# Patient Record
Sex: MALE | Race: BLACK OR AFRICAN AMERICAN | NOT HISPANIC OR LATINO | Employment: FULL TIME | URBAN - METROPOLITAN AREA
[De-identification: names, ages, dates, MRNs, and addresses within clinical notes are randomized per-mention and may not be internally consistent; named-entity substitution may affect disease eponyms.]

---

## 2018-03-02 ENCOUNTER — HOSPITAL ENCOUNTER (EMERGENCY)
Facility: HOSPITAL | Age: 28
Discharge: HOME | End: 2018-03-03
Attending: FAMILY MEDICINE

## 2018-03-02 DIAGNOSIS — F10.929 ACUTE ALCOHOLIC INTOXICATION WITH COMPLICATION: Primary | ICD-10-CM

## 2018-03-02 DIAGNOSIS — R41.82 ALTERED MENTAL STATUS: ICD-10-CM

## 2018-03-02 DIAGNOSIS — R46.89 COMBATIVE BEHAVIOR: ICD-10-CM

## 2018-03-02 DIAGNOSIS — S61.210A LACERATION OF RIGHT INDEX FINGER WITHOUT FOREIGN BODY WITHOUT DAMAGE TO NAIL, INITIAL ENCOUNTER: ICD-10-CM

## 2018-03-02 DIAGNOSIS — S61.212A LACERATION OF RIGHT MIDDLE FINGER WITHOUT FOREIGN BODY WITHOUT DAMAGE TO NAIL, INITIAL ENCOUNTER: ICD-10-CM

## 2018-03-02 DIAGNOSIS — S62.640B OPEN NONDISPLACED FRACTURE OF PROXIMAL PHALANX OF RIGHT INDEX FINGER, INITIAL ENCOUNTER: ICD-10-CM

## 2018-03-02 PROCEDURE — 96372 THER/PROPH/DIAG INJ SC/IM: CPT

## 2018-03-02 PROCEDURE — 63600175 PHARM REV CODE 636 W HCPCS

## 2018-03-02 PROCEDURE — 29125 APPL SHORT ARM SPLINT STATIC: CPT | Mod: RT

## 2018-03-02 PROCEDURE — 96374 THER/PROPH/DIAG INJ IV PUSH: CPT

## 2018-03-02 PROCEDURE — 80053 COMPREHEN METABOLIC PANEL: CPT

## 2018-03-02 PROCEDURE — 84484 ASSAY OF TROPONIN QUANT: CPT

## 2018-03-02 PROCEDURE — 13132 CMPLX RPR F/C/C/M/N/AX/G/H/F: CPT | Mod: F6,F7

## 2018-03-02 PROCEDURE — 25000003 PHARM REV CODE 250: Performed by: FAMILY MEDICINE

## 2018-03-02 PROCEDURE — 85025 COMPLETE CBC W/AUTO DIFF WBC: CPT

## 2018-03-02 PROCEDURE — 96361 HYDRATE IV INFUSION ADD-ON: CPT

## 2018-03-02 PROCEDURE — 96375 TX/PRO/DX INJ NEW DRUG ADDON: CPT

## 2018-03-02 PROCEDURE — 80320 DRUG SCREEN QUANTALCOHOLS: CPT

## 2018-03-02 PROCEDURE — 99285 EMERGENCY DEPT VISIT HI MDM: CPT | Mod: 25

## 2018-03-02 RX ORDER — DIPHENHYDRAMINE HYDROCHLORIDE 50 MG/ML
50 INJECTION INTRAMUSCULAR; INTRAVENOUS ONCE
Status: COMPLETED | OUTPATIENT
Start: 2018-03-03 | End: 2018-03-02

## 2018-03-02 RX ORDER — LORAZEPAM 2 MG/ML
INJECTION INTRAMUSCULAR
Status: COMPLETED
Start: 2018-03-02 | End: 2018-03-02

## 2018-03-02 RX ADMIN — DIPHENHYDRAMINE HYDROCHLORIDE 50 MG: 50 INJECTION, SOLUTION INTRAMUSCULAR; INTRAVENOUS at 11:03

## 2018-03-02 RX ADMIN — LORAZEPAM 2 MG: 2 INJECTION INTRAMUSCULAR; INTRAVENOUS at 11:03

## 2018-03-02 RX ADMIN — SODIUM CHLORIDE 1000 ML: 0.9 INJECTION, SOLUTION INTRAVENOUS at 11:03

## 2018-03-03 VITALS
WEIGHT: 140 LBS | TEMPERATURE: 97 F | SYSTOLIC BLOOD PRESSURE: 106 MMHG | HEART RATE: 85 BPM | BODY MASS INDEX: 18.96 KG/M2 | DIASTOLIC BLOOD PRESSURE: 59 MMHG | OXYGEN SATURATION: 100 % | RESPIRATION RATE: 25 BRPM | HEIGHT: 72 IN

## 2018-03-03 LAB
ALBUMIN SERPL BCP-MCNC: 4.5 G/DL
ALP SERPL-CCNC: 52 U/L
ALT SERPL W/O P-5'-P-CCNC: 18 U/L
AMORPH CRY UR QL COMP ASSIST: ABNORMAL
AMPHET+METHAMPHET UR QL: NEGATIVE
ANION GAP SERPL CALC-SCNC: 17 MMOL/L
AST SERPL-CCNC: 23 U/L
BACTERIA #/AREA URNS AUTO: ABNORMAL /HPF
BARBITURATES UR QL SCN>200 NG/ML: NEGATIVE
BASOPHILS # BLD AUTO: 0.04 K/UL
BASOPHILS NFR BLD: 0.5 %
BENZODIAZ UR QL SCN>200 NG/ML: NORMAL
BILIRUB SERPL-MCNC: 0.5 MG/DL
BILIRUB UR QL STRIP: NEGATIVE
BUN SERPL-MCNC: 9 MG/DL
BZE UR QL SCN: NEGATIVE
CALCIUM SERPL-MCNC: 8.8 MG/DL
CANNABINOIDS UR QL SCN: NEGATIVE
CHLORIDE SERPL-SCNC: 109 MMOL/L
CLARITY UR REFRACT.AUTO: CLEAR
CO2 SERPL-SCNC: 24 MMOL/L
COLOR UR AUTO: ABNORMAL
CREAT SERPL-MCNC: 1.03 MG/DL
CREAT UR-MCNC: 49.4 MG/DL
DIFFERENTIAL METHOD: ABNORMAL
EOSINOPHIL # BLD AUTO: 0.2 K/UL
EOSINOPHIL NFR BLD: 2.4 %
ERYTHROCYTE [DISTWIDTH] IN BLOOD BY AUTOMATED COUNT: 14.7 %
EST. GFR  (AFRICAN AMERICAN): >60 ML/MIN/1.73 M^2
EST. GFR  (NON AFRICAN AMERICAN): >60 ML/MIN/1.73 M^2
ETHANOL SERPL-MCNC: 214 MG/DL
ETHANOL SERPL-MCNC: 282 MG/DL
GLUCOSE SERPL-MCNC: 90 MG/DL
GLUCOSE UR QL STRIP: NEGATIVE
HCT VFR BLD AUTO: 38.5 %
HGB BLD-MCNC: 12.9 G/DL
HGB UR QL STRIP: NEGATIVE
HYALINE CASTS UR QL AUTO: 1 /LPF
KETONES UR QL STRIP: NEGATIVE
LEUKOCYTE ESTERASE UR QL STRIP: NEGATIVE
LYMPHOCYTES # BLD AUTO: 2.1 K/UL
LYMPHOCYTES NFR BLD: 24.9 %
MCH RBC QN AUTO: 29.2 PG
MCHC RBC AUTO-ENTMCNC: 33.5 G/DL
MCV RBC AUTO: 87 FL
METHADONE UR QL SCN>300 NG/ML: NEGATIVE
MICROSCOPIC COMMENT: ABNORMAL
MONOCYTES # BLD AUTO: 0.6 K/UL
MONOCYTES NFR BLD: 7.4 %
NEUTROPHILS # BLD AUTO: 5.4 K/UL
NEUTROPHILS NFR BLD: 64.6 %
NITRITE UR QL STRIP: NEGATIVE
OPIATES UR QL SCN: NEGATIVE
PCP UR QL SCN>25 NG/ML: NEGATIVE
PH UR STRIP: 6 [PH] (ref 5–8)
PLATELET # BLD AUTO: 257 K/UL
PMV BLD AUTO: 9.8 FL
POTASSIUM SERPL-SCNC: 3.7 MMOL/L
PROT SERPL-MCNC: 7.5 G/DL
PROT UR QL STRIP: ABNORMAL
RBC # BLD AUTO: 4.42 M/UL
RBC #/AREA URNS AUTO: 3 /HPF (ref 0–4)
SODIUM SERPL-SCNC: 150 MMOL/L
SP GR UR STRIP: 1.01 (ref 1–1.03)
SQUAMOUS #/AREA URNS AUTO: ABNORMAL /HPF
TOXICOLOGY INFORMATION: NORMAL
TROPONIN I SERPL DL<=0.01 NG/ML-MCNC: <0.012 NG/ML
URN SPEC COLLECT METH UR: ABNORMAL
UROBILINOGEN UR STRIP-ACNC: NEGATIVE EU/DL
WBC # BLD AUTO: 8.28 K/UL
WBC #/AREA URNS AUTO: 6 /HPF (ref 0–5)

## 2018-03-03 PROCEDURE — 27000221 HC OXYGEN, UP TO 24 HOURS

## 2018-03-03 PROCEDURE — 90715 TDAP VACCINE 7 YRS/> IM: CPT | Performed by: FAMILY MEDICINE

## 2018-03-03 PROCEDURE — 96360 HYDRATION IV INFUSION INIT: CPT | Mod: 59

## 2018-03-03 PROCEDURE — 63600175 PHARM REV CODE 636 W HCPCS: Performed by: FAMILY MEDICINE

## 2018-03-03 PROCEDURE — 80307 DRUG TEST PRSMV CHEM ANLYZR: CPT

## 2018-03-03 PROCEDURE — 93010 ELECTROCARDIOGRAM REPORT: CPT | Mod: ,,, | Performed by: INTERNAL MEDICINE

## 2018-03-03 PROCEDURE — 51701 INSERT BLADDER CATHETER: CPT

## 2018-03-03 PROCEDURE — 31720 CLEARANCE OF AIRWAYS: CPT

## 2018-03-03 PROCEDURE — 93005 ELECTROCARDIOGRAM TRACING: CPT

## 2018-03-03 PROCEDURE — 94664 DEMO&/EVAL PT USE INHALER: CPT

## 2018-03-03 PROCEDURE — 80320 DRUG SCREEN QUANTALCOHOLS: CPT

## 2018-03-03 PROCEDURE — 25000003 PHARM REV CODE 250: Performed by: FAMILY MEDICINE

## 2018-03-03 PROCEDURE — 94760 N-INVAS EAR/PLS OXIMETRY 1: CPT

## 2018-03-03 PROCEDURE — 81000 URINALYSIS NONAUTO W/SCOPE: CPT

## 2018-03-03 PROCEDURE — 90471 IMMUNIZATION ADMIN: CPT | Performed by: FAMILY MEDICINE

## 2018-03-03 PROCEDURE — 63600175 PHARM REV CODE 636 W HCPCS

## 2018-03-03 RX ORDER — FLUMAZENIL 0.1 MG/ML
0.2 INJECTION INTRAVENOUS ONCE
Status: COMPLETED | OUTPATIENT
Start: 2018-03-03 | End: 2018-03-03

## 2018-03-03 RX ORDER — AMOXICILLIN AND CLAVULANATE POTASSIUM 875; 125 MG/1; MG/1
1 TABLET, FILM COATED ORAL 2 TIMES DAILY
Qty: 14 TABLET | Refills: 0 | Status: SHIPPED | OUTPATIENT
Start: 2018-03-03

## 2018-03-03 RX ORDER — CEFTRIAXONE 1 G/1
INJECTION, POWDER, FOR SOLUTION INTRAMUSCULAR; INTRAVENOUS
Status: COMPLETED
Start: 2018-03-03 | End: 2018-03-03

## 2018-03-03 RX ORDER — TRAMADOL HYDROCHLORIDE 50 MG/1
50 TABLET ORAL EVERY 8 HOURS PRN
Qty: 12 TABLET | Refills: 0 | Status: SHIPPED | OUTPATIENT
Start: 2018-03-03 | End: 2018-03-13

## 2018-03-03 RX ORDER — IBUPROFEN 800 MG/1
800 TABLET ORAL 3 TIMES DAILY PRN
Qty: 30 TABLET | Refills: 0 | Status: SHIPPED | OUTPATIENT
Start: 2018-03-03

## 2018-03-03 RX ORDER — CEFTRIAXONE 1 G/1
1 INJECTION, POWDER, FOR SOLUTION INTRAMUSCULAR; INTRAVENOUS
Status: COMPLETED | OUTPATIENT
Start: 2018-03-03 | End: 2018-03-03

## 2018-03-03 RX ORDER — LIDOCAINE HYDROCHLORIDE 10 MG/ML
10 INJECTION INFILTRATION; PERINEURAL
Status: COMPLETED | OUTPATIENT
Start: 2018-03-03 | End: 2018-03-03

## 2018-03-03 RX ORDER — ONDANSETRON 2 MG/ML
8 INJECTION INTRAMUSCULAR; INTRAVENOUS
Status: COMPLETED | OUTPATIENT
Start: 2018-03-03 | End: 2018-03-03

## 2018-03-03 RX ADMIN — CLOSTRIDIUM TETANI TOXOID ANTIGEN (FORMALDEHYDE INACTIVATED), CORYNEBACTERIUM DIPHTHERIAE TOXOID ANTIGEN (FORMALDEHYDE INACTIVATED), BORDETELLA PERTUSSIS TOXOID ANTIGEN (GLUTARALDEHYDE INACTIVATED), BORDETELLA PERTUSSIS FILAMENTOUS HEMAGGLUTININ ANTIGEN (FORMALDEHYDE INACTIVATED), BORDETELLA PERTUSSIS PERTACTIN ANTIGEN, AND BORDETELLA PERTUSSIS FIMBRIAE 2/3 ANTIGEN 0.5 ML: 5; 2; 2.5; 5; 3; 5 INJECTION, SUSPENSION INTRAMUSCULAR at 12:03

## 2018-03-03 RX ADMIN — FLUMAZENIL 0.2 MG: 0.1 INJECTION, SOLUTION INTRAVENOUS at 05:03

## 2018-03-03 RX ADMIN — CEFTRIAXONE 1 G: 1 INJECTION, POWDER, FOR SOLUTION INTRAMUSCULAR; INTRAVENOUS at 01:03

## 2018-03-03 RX ADMIN — CEFTRIAXONE SODIUM 1 G: 1 INJECTION, POWDER, FOR SOLUTION INTRAMUSCULAR; INTRAVENOUS at 01:03

## 2018-03-03 RX ADMIN — ONDANSETRON 8 MG: 2 INJECTION INTRAMUSCULAR; INTRAVENOUS at 01:03

## 2018-03-03 RX ADMIN — LIDOCAINE HYDROCHLORIDE 10 ML: 10 INJECTION, SOLUTION INFILTRATION; PERINEURAL at 12:03

## 2018-03-03 RX ADMIN — SODIUM CHLORIDE 1000 ML: 0.9 INJECTION, SOLUTION INTRAVENOUS at 12:03

## 2018-03-03 NOTE — ED NOTES
Pt was awaken with washing his face with a cool towel. I offered Juice he requested apple and ask if I would remove the restraint off his hand. Restraint was removed. Pt stated to sit up in bed he was still uncoordinated - instructed to sit up on side of bed, Indu Keane and Josie in room at this time. PT wants to walk to restroom- he was assisted by myself to restroom and given a pair blue scrubs to put on for discharge.     He put on scrubs, PIV was removed. DC instructions were given to PT's sister. PT was wheeled to private auto  nurse.

## 2018-03-03 NOTE — ED PROVIDER NOTES
Encounter Date: 3/2/2018       History     Chief Complaint   Patient presents with    Hand Injury     Pt flagged down [facility] and they noted blood dripping down arms.  When they attempted to determine etiology of injury they noted patient appeared altered, uncooperative, and quickly became very combative.  2 large lac's to index and middle finger of right hand actively bleeding, pt smells of ETOH, received Versed 5 mg IN enroute, pt very combative/uncooperative upon arrival to ED, climbed off of EMS stretcher requiring soft wrist restrainst by [facility] and ED staff to ensure patient safety     Patient was intoxicated and altered mental status when police found him.  He was knocking on somebody door who alerted .  When  tried to find him he had a bleeding hand and was walking away and not listening to them.  Eventually as he tried to run  restrain him and brought to the ER.  He was brought to ER by EMS who had to physically restrain him and have given Versed 5 mg intravenously.  On arrival to the ED patient is combative and looks intoxicated with alcohol and not listening.  He was given Ativan 2 mg IM but still continues to be combative and tries to get out of the bed and wants to leave ER.  Then he was given Ativan 2 mg IV and Benadryl 50 mg IV to calm him down.      The history is provided by the EMS personnel.     Review of patient's allergies indicates:  No Known Allergies  History reviewed. No pertinent past medical history.  History reviewed. No pertinent surgical history.  History reviewed. No pertinent family history.  Social History   Substance Use Topics    Smoking status: Unknown If Ever Smoked    Smokeless tobacco: Not on file    Alcohol use Yes     Review of Systems   Unable to perform ROS: Mental status change       Physical Exam     Initial Vitals [03/02/18 2348]   BP Pulse Resp Temp SpO2   (!) 113/57 96 (!) 24 97.4 °F (36.3 °C) 100 %      MAP       75.67         Physical Exam    Nursing note  and vitals reviewed.  Constitutional: He appears well-developed and well-nourished.   HENT:   Head: Normocephalic.   Right Ear: External ear normal.   Left Ear: External ear normal.   Nose: Nose normal.   Mouth/Throat: Oropharynx is clear and moist.   Eyes: Conjunctivae and EOM are normal. Pupils are equal, round, and reactive to light.   Neck: Normal range of motion. Neck supple.   Cardiovascular: Normal rate, regular rhythm, normal heart sounds and intact distal pulses.   No murmur heard.  Pulmonary/Chest: Breath sounds normal. No respiratory distress. He has no wheezes. He has no rhonchi. He has no rales.   Abdominal: Soft. Bowel sounds are normal. He exhibits no distension. There is no tenderness. There is no rebound and no guarding.   Musculoskeletal: Normal range of motion.        Hands:  1) deep laceration to right index finger about 4.5 cm. Plantar aspect. Proximal phalanx  2) deep laceration to right middle finger 3 cm,  Plantar aspect. Proximal phalanx   Neurological: He is alert and oriented to person, place, and time. He has normal strength.   Skin: Skin is warm. Capillary refill takes less than 2 seconds.   Psychiatric: He has a normal mood and affect. Thought content normal.         ED Course   Lac Repair  Date/Time: 3/3/2018 1:39 AM  Performed by: MEGAN ORTA  Authorized by: MEGAN ORTA   Consent Done: Emergent Situation  Body area: upper extremity  Location details: right long finger  Laceration length: 3 cm  Foreign bodies: no foreign bodies  Tendon involvement: superficial  Nerve involvement: superficial  Vascular damage: yes  Anesthesia: local infiltration    Anesthesia:  Local Anesthetic: lidocaine 1% without epinephrine  Anesthetic total: 3 mL  Patient sedated: yes  Sedation type: anxiolysis  (See MAR for exact dosages of medications).  Sedatives: lorazepam  Sedation start date/time: 3/3/2018 12:00 AM  Sedation end date/time: 3/3/2018 1:41 AM  Vitals: Vital signs were monitored  during sedation.  Preparation: Patient was prepped and draped in the usual sterile fashion.  Irrigation solution: saline  Irrigation method: jet lavage and syringe  Amount of cleaning: standard  Debridement: none  Degree of undermining: minimal  Skin closure: 4-0 nylon  Number of sutures: 5  Technique: simple  Approximation: close  Approximation difficulty: simple  Dressing: dressing applied, non-stick sterile dressing and splint for protection  Patient tolerance: Patient tolerated the procedure well with no immediate complications    Lac Repair  Date/Time: 3/3/2018 1:43 AM  Performed by: MEGAN ORTA  Authorized by: MEGAN ORTA   Consent Done: Emergent Situation  Body area: upper extremity  Location details: right index finger  Laceration length: 4.5 cm  Foreign bodies: no foreign bodies  Tendon involvement: superficial  Nerve involvement: superficial  Vascular damage: yes  Anesthesia: local infiltration    Anesthesia:  Local Anesthetic: lidocaine 1% without epinephrine  Anesthetic total: 3 mL  Patient sedated: yes  Sedatives: lorazepam  Sedation start date/time: 3/3/2018 12:00 AM  Sedation end date/time: 3/3/2018 1:45 AM  Vitals: Vital signs were monitored during sedation.  Preparation: Patient was prepped and draped in the usual sterile fashion.  Irrigation solution: saline  Irrigation method: syringe  Amount of cleaning: standard  Debridement: none  Degree of undermining: minimal  Skin closure: 4-0 nylon  Number of sutures: 8  Technique: simple  Approximation: close  Approximation difficulty: simple  Dressing: dressing applied, non-stick sterile dressing and splint for protection  Patient tolerance: Patient tolerated the procedure well with no immediate complications        Labs Reviewed   DRUG SCREEN PANEL, URINE EMERGENCY   CBC W/ AUTO DIFFERENTIAL   COMPREHENSIVE METABOLIC PANEL   TROPONIN I   URINALYSIS   ALCOHOL,MEDICAL (ETHANOL)     EKG Readings: (Independently Interpreted)   Rhythm: Normal  Sinus Rhythm. Ectopy: No Ectopy. Conduction: RBBB. ST Segments: Normal ST Segments. T Waves: Normal. Clinical Impression: Normal Sinus Rhythm     5 AM patient is reevaluated-more awake and talking but still drowsy.  His alcohol level is still high.  Romazicon and check his mental status again.     Medical Decision Making:   History:   I obtained history from: someone other than patient and EMS provider.       <> Summary of History: Patient is under alcohol intoxication and has been incoherent and had injury to his right hand Running away from  when they caught him and called EMS and sent the patient to the ER for evaluation he was combative during the entire transportation and received Versed intranasally 5 mg and also Ativan on arrival.  Initial Assessment:   Patient is alcohol intoxicated and combative with right hand injury with laceration and bleeding.  Differential Diagnosis:   Alcohol intoxication, drug abuse, combative, right hand laceration.  Clinical Tests:   Lab Tests: Ordered and Reviewed  Radiological Study: Ordered and Reviewed  Medical Tests: Ordered and Reviewed  ED Management:  Patient was very combative and could not examine and sutured his right hand.  He was sedated with Ativan and Benadryl.  He was put on oxygen and cardiac monitor.  There are deep lacerations and right hand index and middle finger which had arterial bleeding.  Laceration was repaired bleeding stopped.  There was suspicious of index finger fracture at proximal phalanx.  Patient is started on antibiotics and also given tetanus shot.  His right hand is placed in splint and sling.  As patient mental status slowly improved he was given Romazicon to reverse Ativan.  As patient mental status improved is discharged home in stable condition and advised to follow-up with orthopedics.  Patient sister is along with the patient hold advised him to continue antibiotics and pain medication as needed.                     patient will be  discharged once he is awake and alert.  Dr. Denny notified about discharge plan.    Clinical Impression:   The primary encounter diagnosis was Acute alcoholic intoxication with complication. Diagnoses of Altered mental status, Combative behavior, Laceration of right index finger without foreign body without damage to nail, initial encounter, Laceration of right middle finger without foreign body without damage to nail, initial encounter, and Open nondisplaced fracture of proximal phalanx of right index finger, initial encounter were also pertinent to this visit.    Disposition:   Disposition: Discharged  Condition: Fair                        Parish Ponce MD  03/03/18 9360

## 2018-03-03 NOTE — ED NOTES
Late entry - patient arrival via EMS, awake and talking but uncooperative, climbed off of EMS stretcher, very unsteady on feet and not responding to verbal re-direction from ED staff and [facility].  Attempted multiple times to direct pt to ED stretcher without success; secondary to patient becoming increasingly combative, actively heavy bleeding from wounds on right hand, patient swaying on feet and lurching at ED staff, he was assisted back to bed and medicated with Ativan 2 mg IM per Dr. Ponce order.      2350 addendum - Pt remains very uncooperative, repeated attempts at de-escalation were unsuccessful and necessitated further physical redirection back to ED stretcher with assistance from [facility] and security staff.  IV started by NEVAEH Yao and pt medicated as per Dr. Ponce order, hand re-dressed with 4x4's/kerlix dressing.  (+) distal pulses and cap refill before/after dressing, patient placed on CM showing SR, no ectopy noted, initial room air pulse ox of 99% with subsequent desat to 94-95%, patient placed on O2 @ 2L NC.  MD at bedside throughout initial triage/assessment/medication administration.

## 2018-03-03 NOTE — ED NOTES
Minimal response after 1st dose of Romazicon, Dr. Ponce notified and 2nd dose ordered and given.  Sister remains at bedside, pt still sleepy but able to answer questions, aware that sister is at bedside after 2nd dose given.  VSS as charted, awaiting further orders.

## 2018-03-03 NOTE — ED NOTES
Pt resting quietly with eyes closed, NAD noted, respirations even/unlabored.  Pt remains very sleepy, not wakening easily to voice.  Dr. Denny at bedside, states that patient can be discharged when he is easily arouseable and room air sats are 95%.  Sister at bedside and aware of plan of care, all questions answered.  Will continue to monitor.

## 2018-03-03 NOTE — ED NOTES
Pt lying in bed asleep. Respirations even and unlabored. Skin warm and dry. NAD noted. Vital signs stable. NSR noted on monitor. Pts sister now at bedside.

## 2018-03-03 NOTE — ED NOTES
Attempted to wake patient once again, he remains very sleepy, responds to noxious stimuli but appears very lethargic.  CM showing SR no ectopy noted, VS as charted.  Report given to day shift RN.

## 2018-03-03 NOTE — ED NOTES
Pt resting quietly with eyes closed, snoring gently, pulse ox 100% on 3L NC.  VS as charted, CM showing ST rate of 104, no ectopy noted.  Sister Gita Savana at bedside and aware of plan of care, all questions answered.  Provided sibling with po fluids and meal tray at her request, awaiting further orders/dispo, will continue to closely monitor.

## 2018-03-03 NOTE — ED NOTES
Dr. Ponce at bedside to assess splint to right hand, assisted to remove ace wrap, loosen ortho glass and replace ace.  Positive cap refill post application, skin warm/dry.  Sister at bedside and aware of plan of care, will medicate as ordered and assess for response.